# Patient Record
Sex: FEMALE | Race: WHITE | NOT HISPANIC OR LATINO | Employment: OTHER | URBAN - METROPOLITAN AREA
[De-identification: names, ages, dates, MRNs, and addresses within clinical notes are randomized per-mention and may not be internally consistent; named-entity substitution may affect disease eponyms.]

---

## 2020-06-11 ENCOUNTER — TELEPHONE (OUTPATIENT)
Dept: HEMATOLOGY/ONCOLOGY | Facility: CLINIC | Age: 50
End: 2020-06-11

## 2020-06-11 NOTE — TELEPHONE ENCOUNTER
----- Message from Sangita Laguna sent at 6/11/2020  1:32 PM CDT -----  Contact: [phone]/self  Patient is requesting a call back regarding finding a doctor to treat her. No one can find her veins unless they use an US and they have to do a central line. She has tumors in her neck and has scans/contrast ordered but they can't find her veins to do them. She is seen by Lidia Brizuela. There are no records of doctors nor treatments in the system. Please call her to discuss. Thanks

## 2020-06-11 NOTE — TELEPHONE ENCOUNTER
Spoke with patient who states she has a problem with her veins. She says that she has had a CT ordered and they can't get contrast in her because her veins keep blowing. States she has tumors in her neck and is being seen by an MD in [facility]. When asked if her tumors are benign or cancerous she states they are benign.  She states she really wants to know what is wrong with her veins at this point.  I spoke with our Nurse Navigator Rosa, who states that she will call the patient and speak with her.

## 2020-06-11 NOTE — TELEPHONE ENCOUNTER
LM for pt to return my call regarding message below.       ----- Message from Pat Torres LPN sent at 6/11/2020  2:01 PM CDT -----  Contact: [phone]/self      ----- Message -----  From: Sangita Laguna  Sent: 6/11/2020   1:32 PM CDT  To: [facility] Hematology Oncology Clinical Support    Patient is requesting a call back regarding finding a doctor to treat her. No one can find her veins unless they use an US and they have to do a central line. She has tumors in her neck and has scans/contrast ordered but they can't find her veins to do them. She is seen by Lidia Brizuela. There are no records of doctors nor treatments in the system. Please call her to discuss. Thanks

## 2020-06-11 NOTE — TELEPHONE ENCOUNTER
----- Message from Pat Torres LPN sent at 6/11/2020  1:45 PM CDT -----  Contact: [phone]/self      ----- Message -----  From: Sangita Laguna  Sent: 6/11/2020   1:32 PM CDT  To: [facility] Hematology Oncology Clinical Support    Patient is requesting a call back regarding finding a doctor to treat her. No one can find her veins unless they use an US and they have to do a central line. She has tumors in her neck and has scans/contrast ordered but they can't find her veins to do them. She is seen by Lidia Brizuela. There are no records of doctors nor treatments in the system. Please call her to discuss. Thanks

## 2020-10-21 PROBLEM — F43.10 PTSD (POST-TRAUMATIC STRESS DISORDER): Status: ACTIVE | Noted: 2018-03-28

## 2020-10-21 PROBLEM — E78.5 HYPERLIPIDEMIA: Status: ACTIVE | Noted: 2018-03-28

## 2020-10-21 PROBLEM — F99 INSOMNIA DUE TO OTHER MENTAL DISORDER: Status: ACTIVE | Noted: 2018-03-28

## 2020-10-21 PROBLEM — E53.8 B12 DEFICIENCY: Status: ACTIVE | Noted: 2019-03-14

## 2020-10-21 PROBLEM — R00.1 BRADYCARDIA: Status: ACTIVE | Noted: 2019-09-01

## 2020-10-21 PROBLEM — Z98.84 HISTORY OF ROUX-EN-Y GASTRIC BYPASS: Status: ACTIVE | Noted: 2019-09-01

## 2020-10-21 PROBLEM — F32.A DEPRESSION: Status: ACTIVE | Noted: 2020-10-21

## 2020-10-21 PROBLEM — F41.9 ANXIETY: Status: ACTIVE | Noted: 2020-10-21

## 2020-10-21 PROBLEM — J45.20 MILD INTERMITTENT ASTHMA WITHOUT COMPLICATION: Status: ACTIVE | Noted: 2018-10-31

## 2020-10-21 PROBLEM — R11.0 CHRONIC NAUSEA: Status: ACTIVE | Noted: 2018-03-28

## 2020-10-21 PROBLEM — F51.05 INSOMNIA DUE TO OTHER MENTAL DISORDER: Status: ACTIVE | Noted: 2018-03-28

## 2020-10-21 PROBLEM — Z86.718 HISTORY OF DVT (DEEP VEIN THROMBOSIS): Status: ACTIVE | Noted: 2020-10-21

## 2020-10-22 ENCOUNTER — TELEPHONE (OUTPATIENT)
Dept: OPHTHALMOLOGY | Facility: CLINIC | Age: 50
End: 2020-10-22

## 2020-11-03 ENCOUNTER — TELEPHONE (OUTPATIENT)
Dept: OPHTHALMOLOGY | Facility: CLINIC | Age: 50
End: 2020-11-03

## 2022-11-29 ENCOUNTER — TELEPHONE (OUTPATIENT)
Dept: PSYCHIATRY | Facility: CLINIC | Age: 52
End: 2022-11-29
Payer: MEDICARE

## 2022-11-29 NOTE — TELEPHONE ENCOUNTER
----- Message from Latonya Brizuela sent at 11/29/2022  1:47 PM CST -----  Regarding: Voicemail  Called for np appointment

## 2022-11-30 ENCOUNTER — LAB VISIT (OUTPATIENT)
Dept: LAB | Facility: HOSPITAL | Age: 52
End: 2022-11-30
Attending: FAMILY MEDICINE
Payer: MEDICARE

## 2022-11-30 ENCOUNTER — OFFICE VISIT (OUTPATIENT)
Dept: PRIMARY CARE CLINIC | Facility: CLINIC | Age: 52
End: 2022-11-30
Payer: MEDICARE

## 2022-11-30 VITALS
HEIGHT: 63 IN | TEMPERATURE: 98 F | DIASTOLIC BLOOD PRESSURE: 62 MMHG | HEART RATE: 71 BPM | WEIGHT: 160.94 LBS | BODY MASS INDEX: 28.52 KG/M2 | RESPIRATION RATE: 16 BRPM | SYSTOLIC BLOOD PRESSURE: 110 MMHG | OXYGEN SATURATION: 99 %

## 2022-11-30 DIAGNOSIS — E53.8 B12 DEFICIENCY: ICD-10-CM

## 2022-11-30 DIAGNOSIS — F43.10 PTSD (POST-TRAUMATIC STRESS DISORDER): ICD-10-CM

## 2022-11-30 DIAGNOSIS — K90.9 INTESTINAL MALABSORPTION, UNSPECIFIED TYPE: ICD-10-CM

## 2022-11-30 DIAGNOSIS — Z79.899 LONG-TERM USE OF HIGH-RISK MEDICATION: ICD-10-CM

## 2022-11-30 DIAGNOSIS — Z98.84 HISTORY OF ROUX-EN-Y GASTRIC BYPASS: ICD-10-CM

## 2022-11-30 DIAGNOSIS — E78.5 HYPERLIPIDEMIA, UNSPECIFIED HYPERLIPIDEMIA TYPE: ICD-10-CM

## 2022-11-30 DIAGNOSIS — F32.A ANXIETY AND DEPRESSION: ICD-10-CM

## 2022-11-30 DIAGNOSIS — F41.9 ANXIETY AND DEPRESSION: ICD-10-CM

## 2022-11-30 DIAGNOSIS — Z76.89 ENCOUNTER TO ESTABLISH CARE: Primary | ICD-10-CM

## 2022-11-30 DIAGNOSIS — E55.9 VITAMIN D DEFICIENCY: ICD-10-CM

## 2022-11-30 DIAGNOSIS — R00.1 BRADYCARDIA: ICD-10-CM

## 2022-11-30 DIAGNOSIS — Z11.4 ENCOUNTER FOR SCREENING FOR HIV: ICD-10-CM

## 2022-11-30 DIAGNOSIS — R55 SYNCOPE, UNSPECIFIED SYNCOPE TYPE: ICD-10-CM

## 2022-11-30 DIAGNOSIS — J45.30 MILD PERSISTENT ASTHMA WITHOUT COMPLICATION: ICD-10-CM

## 2022-11-30 DIAGNOSIS — Z76.89 ENCOUNTER TO ESTABLISH CARE: ICD-10-CM

## 2022-11-30 DIAGNOSIS — Z12.31 BREAST CANCER SCREENING BY MAMMOGRAM: ICD-10-CM

## 2022-11-30 DIAGNOSIS — J39.2 CYST OF PHARYNX: ICD-10-CM

## 2022-11-30 LAB
ALBUMIN SERPL BCP-MCNC: 4.4 G/DL (ref 3.5–5.2)
ALP SERPL-CCNC: 117 U/L (ref 55–135)
ALT SERPL W/O P-5'-P-CCNC: 27 U/L (ref 10–44)
ANION GAP SERPL CALC-SCNC: 13 MMOL/L (ref 8–16)
AST SERPL-CCNC: 18 U/L (ref 10–40)
BASOPHILS # BLD AUTO: 0.04 K/UL (ref 0–0.2)
BASOPHILS NFR BLD: 0.8 % (ref 0–1.9)
BILIRUB SERPL-MCNC: 1.1 MG/DL (ref 0.1–1)
BUN SERPL-MCNC: 16 MG/DL (ref 6–20)
CALCIUM SERPL-MCNC: 9.9 MG/DL (ref 8.7–10.5)
CHLORIDE SERPL-SCNC: 104 MMOL/L (ref 95–110)
CHOLEST SERPL-MCNC: 215 MG/DL (ref 120–199)
CHOLEST/HDLC SERPL: 3 {RATIO} (ref 2–5)
CO2 SERPL-SCNC: 26 MMOL/L (ref 23–29)
CREAT SERPL-MCNC: 0.8 MG/DL (ref 0.5–1.4)
DIFFERENTIAL METHOD: ABNORMAL
EOSINOPHIL # BLD AUTO: 0.1 K/UL (ref 0–0.5)
EOSINOPHIL NFR BLD: 2.1 % (ref 0–8)
ERYTHROCYTE [DISTWIDTH] IN BLOOD BY AUTOMATED COUNT: 14.3 % (ref 11.5–14.5)
EST. GFR  (NO RACE VARIABLE): >60 ML/MIN/1.73 M^2
ESTIMATED AVG GLUCOSE: 97 MG/DL (ref 68–131)
GLUCOSE SERPL-MCNC: 94 MG/DL (ref 70–110)
HBA1C MFR BLD: 5 % (ref 4–5.6)
HCT VFR BLD AUTO: 42.3 % (ref 37–48.5)
HDLC SERPL-MCNC: 72 MG/DL (ref 40–75)
HDLC SERPL: 33.5 % (ref 20–50)
HGB BLD-MCNC: 13.5 G/DL (ref 12–16)
IMM GRANULOCYTES # BLD AUTO: 0.01 K/UL (ref 0–0.04)
IMM GRANULOCYTES NFR BLD AUTO: 0.2 % (ref 0–0.5)
LDLC SERPL CALC-MCNC: 129.2 MG/DL (ref 63–159)
LYMPHOCYTES # BLD AUTO: 1 K/UL (ref 1–4.8)
LYMPHOCYTES NFR BLD: 18.9 % (ref 18–48)
MCH RBC QN AUTO: 30 PG (ref 27–31)
MCHC RBC AUTO-ENTMCNC: 31.9 G/DL (ref 32–36)
MCV RBC AUTO: 94 FL (ref 82–98)
MONOCYTES # BLD AUTO: 0.3 K/UL (ref 0.3–1)
MONOCYTES NFR BLD: 5.5 % (ref 4–15)
NEUTROPHILS # BLD AUTO: 3.7 K/UL (ref 1.8–7.7)
NEUTROPHILS NFR BLD: 72.5 % (ref 38–73)
NONHDLC SERPL-MCNC: 143 MG/DL
NRBC BLD-RTO: 0 /100 WBC
PLATELET # BLD AUTO: 269 K/UL (ref 150–450)
PMV BLD AUTO: 12.1 FL (ref 9.2–12.9)
POTASSIUM SERPL-SCNC: 4.3 MMOL/L (ref 3.5–5.1)
PROT SERPL-MCNC: 7.7 G/DL (ref 6–8.4)
RBC # BLD AUTO: 4.5 M/UL (ref 4–5.4)
SODIUM SERPL-SCNC: 143 MMOL/L (ref 136–145)
TRIGL SERPL-MCNC: 69 MG/DL (ref 30–150)
TSH SERPL DL<=0.005 MIU/L-ACNC: 2.25 UIU/ML (ref 0.4–4)
WBC # BLD AUTO: 5.12 K/UL (ref 3.9–12.7)

## 2022-11-30 PROCEDURE — 3008F PR BODY MASS INDEX (BMI) DOCUMENTED: ICD-10-PCS | Mod: CPTII,S$GLB,, | Performed by: FAMILY MEDICINE

## 2022-11-30 PROCEDURE — 82306 VITAMIN D 25 HYDROXY: CPT | Performed by: FAMILY MEDICINE

## 2022-11-30 PROCEDURE — 3008F BODY MASS INDEX DOCD: CPT | Mod: CPTII,S$GLB,, | Performed by: FAMILY MEDICINE

## 2022-11-30 PROCEDURE — 99999 PR PBB SHADOW E&M-EST. PATIENT-LVL V: ICD-10-PCS | Mod: PBBFAC,,, | Performed by: FAMILY MEDICINE

## 2022-11-30 PROCEDURE — 3074F PR MOST RECENT SYSTOLIC BLOOD PRESSURE < 130 MM HG: ICD-10-PCS | Mod: CPTII,S$GLB,, | Performed by: FAMILY MEDICINE

## 2022-11-30 PROCEDURE — 36415 COLL VENOUS BLD VENIPUNCTURE: CPT | Mod: PN | Performed by: FAMILY MEDICINE

## 2022-11-30 PROCEDURE — 80053 COMPREHEN METABOLIC PANEL: CPT | Performed by: FAMILY MEDICINE

## 2022-11-30 PROCEDURE — 84443 ASSAY THYROID STIM HORMONE: CPT | Performed by: FAMILY MEDICINE

## 2022-11-30 PROCEDURE — 99999 PR PBB SHADOW E&M-EST. PATIENT-LVL V: CPT | Mod: PBBFAC,,, | Performed by: FAMILY MEDICINE

## 2022-11-30 PROCEDURE — 99204 PR OFFICE/OUTPT VISIT, NEW, LEVL IV, 45-59 MIN: ICD-10-PCS | Mod: S$GLB,,, | Performed by: FAMILY MEDICINE

## 2022-11-30 PROCEDURE — 1160F PR REVIEW ALL MEDS BY PRESCRIBER/CLIN PHARMACIST DOCUMENTED: ICD-10-PCS | Mod: CPTII,S$GLB,, | Performed by: FAMILY MEDICINE

## 2022-11-30 PROCEDURE — 3078F PR MOST RECENT DIASTOLIC BLOOD PRESSURE < 80 MM HG: ICD-10-PCS | Mod: CPTII,S$GLB,, | Performed by: FAMILY MEDICINE

## 2022-11-30 PROCEDURE — 3078F DIAST BP <80 MM HG: CPT | Mod: CPTII,S$GLB,, | Performed by: FAMILY MEDICINE

## 2022-11-30 PROCEDURE — 1159F PR MEDICATION LIST DOCUMENTED IN MEDICAL RECORD: ICD-10-PCS | Mod: CPTII,S$GLB,, | Performed by: FAMILY MEDICINE

## 2022-11-30 PROCEDURE — 1160F RVW MEDS BY RX/DR IN RCRD: CPT | Mod: CPTII,S$GLB,, | Performed by: FAMILY MEDICINE

## 2022-11-30 PROCEDURE — 83921 ORGANIC ACID SINGLE QUANT: CPT | Performed by: FAMILY MEDICINE

## 2022-11-30 PROCEDURE — 87389 HIV-1 AG W/HIV-1&-2 AB AG IA: CPT | Performed by: FAMILY MEDICINE

## 2022-11-30 PROCEDURE — 80061 LIPID PANEL: CPT | Performed by: FAMILY MEDICINE

## 2022-11-30 PROCEDURE — 3074F SYST BP LT 130 MM HG: CPT | Mod: CPTII,S$GLB,, | Performed by: FAMILY MEDICINE

## 2022-11-30 PROCEDURE — 83036 HEMOGLOBIN GLYCOSYLATED A1C: CPT | Performed by: FAMILY MEDICINE

## 2022-11-30 PROCEDURE — 1159F MED LIST DOCD IN RCRD: CPT | Mod: CPTII,S$GLB,, | Performed by: FAMILY MEDICINE

## 2022-11-30 PROCEDURE — 85025 COMPLETE CBC W/AUTO DIFF WBC: CPT | Performed by: FAMILY MEDICINE

## 2022-11-30 PROCEDURE — 82607 VITAMIN B-12: CPT | Performed by: FAMILY MEDICINE

## 2022-11-30 PROCEDURE — 99204 OFFICE O/P NEW MOD 45 MIN: CPT | Mod: S$GLB,,, | Performed by: FAMILY MEDICINE

## 2022-11-30 RX ORDER — ALBUTEROL SULFATE 90 UG/1
2 AEROSOL, METERED RESPIRATORY (INHALATION) EVERY 6 HOURS PRN
COMMUNITY
Start: 2022-08-08 | End: 2022-11-30 | Stop reason: SDUPTHER

## 2022-11-30 RX ORDER — ONDANSETRON 4 MG/1
4 TABLET, FILM COATED ORAL DAILY PRN
Qty: 90 TABLET | Refills: 3 | Status: SHIPPED | OUTPATIENT
Start: 2022-11-30

## 2022-11-30 RX ORDER — PRAZOSIN HYDROCHLORIDE 2 MG/1
2 CAPSULE ORAL NIGHTLY
COMMUNITY
Start: 2022-08-04 | End: 2022-11-30 | Stop reason: SDUPTHER

## 2022-11-30 RX ORDER — FLUOROMETHOLONE 1 MG/ML
SUSPENSION/ DROPS OPHTHALMIC
COMMUNITY
Start: 2022-06-28

## 2022-11-30 RX ORDER — ONDANSETRON 4 MG/1
4 TABLET, FILM COATED ORAL DAILY PRN
COMMUNITY
Start: 2022-08-08 | End: 2022-11-30 | Stop reason: SDUPTHER

## 2022-11-30 RX ORDER — ALPRAZOLAM 1 MG/1
1 TABLET ORAL
COMMUNITY
Start: 2022-11-05

## 2022-11-30 RX ORDER — ZOLPIDEM TARTRATE 10 MG/1
1 TABLET ORAL NIGHTLY PRN
COMMUNITY
Start: 2022-11-29

## 2022-11-30 RX ORDER — FLUTICASONE PROPIONATE AND SALMETEROL 250; 50 UG/1; UG/1
1 POWDER RESPIRATORY (INHALATION) 2 TIMES DAILY
Qty: 180 EACH | Refills: 3 | Status: SHIPPED | OUTPATIENT
Start: 2022-11-30 | End: 2024-03-04 | Stop reason: SDUPTHER

## 2022-11-30 RX ORDER — CYANOCOBALAMIN 1000 UG/ML
1000 INJECTION, SOLUTION INTRAMUSCULAR; SUBCUTANEOUS
COMMUNITY
Start: 2022-08-08 | End: 2022-11-30 | Stop reason: SDUPTHER

## 2022-11-30 RX ORDER — SERTRALINE HYDROCHLORIDE 100 MG/1
1 TABLET, FILM COATED ORAL NIGHTLY PRN
COMMUNITY
Start: 2022-11-29

## 2022-11-30 RX ORDER — ALBUTEROL SULFATE 0.83 MG/ML
SOLUTION RESPIRATORY (INHALATION)
Qty: 75 ML | Refills: 3 | Status: SHIPPED | OUTPATIENT
Start: 2022-11-30

## 2022-11-30 RX ORDER — FLUTICASONE PROPIONATE AND SALMETEROL 250; 50 UG/1; UG/1
POWDER RESPIRATORY (INHALATION)
COMMUNITY
Start: 2022-09-07 | End: 2022-11-30 | Stop reason: SDUPTHER

## 2022-11-30 RX ORDER — TRIAMCINOLONE ACETONIDE 1 MG/G
1 CREAM TOPICAL 2 TIMES DAILY
COMMUNITY
Start: 2022-01-11 | End: 2023-03-15

## 2022-11-30 RX ORDER — PRAZOSIN HYDROCHLORIDE 2 MG/1
2 CAPSULE ORAL NIGHTLY
Qty: 90 CAPSULE | Refills: 3 | Status: SHIPPED | OUTPATIENT
Start: 2022-11-30

## 2022-11-30 RX ORDER — NALOXONE HYDROCHLORIDE 4 MG/.1ML
SPRAY NASAL
COMMUNITY
Start: 2022-09-07

## 2022-11-30 RX ORDER — CYCLOSPORINE 0.5 MG/ML
1 EMULSION OPHTHALMIC 2 TIMES DAILY
COMMUNITY
Start: 2022-07-16

## 2022-11-30 RX ORDER — ALBUTEROL SULFATE 0.83 MG/ML
SOLUTION RESPIRATORY (INHALATION)
COMMUNITY
Start: 2022-09-07 | End: 2022-11-30 | Stop reason: SDUPTHER

## 2022-11-30 RX ORDER — ALBUTEROL SULFATE 90 UG/1
2 AEROSOL, METERED RESPIRATORY (INHALATION) EVERY 6 HOURS PRN
Qty: 18 G | Refills: 3 | Status: SHIPPED | OUTPATIENT
Start: 2022-11-30 | End: 2024-03-04 | Stop reason: SDUPTHER

## 2022-11-30 RX ORDER — HYDROCODONE BITARTRATE AND ACETAMINOPHEN 7.5; 325 MG/1; MG/1
TABLET ORAL
COMMUNITY
Start: 2022-08-08

## 2022-11-30 RX ORDER — VENLAFAXINE HYDROCHLORIDE 37.5 MG/1
37.5 CAPSULE, EXTENDED RELEASE ORAL
COMMUNITY
Start: 2022-07-25

## 2022-11-30 RX ORDER — CYANOCOBALAMIN 1000 UG/ML
1000 INJECTION, SOLUTION INTRAMUSCULAR; SUBCUTANEOUS
Qty: 3 ML | Refills: 3 | Status: SHIPPED | OUTPATIENT
Start: 2022-11-30 | End: 2024-03-04 | Stop reason: SDUPTHER
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
## 2022-11-30 NOTE — PATIENT INSTRUCTIONS
Physically, everything looks pretty good today.      Screening blood work done today.  Results will be posted to [facility] uses they are available.      Medication refills sent to pharmacy today.  Please continue taking them, as directed.      Where overdue for screening mammogram.  Order placed today.  Feel free to schedule this at your convenience.      Referral to cardiology-electrophysiology placed today.  Someone from that department should contact you to schedule an appointment.      For now, continue your established relationship with Dr. Hall, psychiatry.    For the throat, try using saltwater gargles two or 3 times per day, 15 seconds at a time.  This can help soothe the throat.  I also like OTC zinc lozenges.  If the throat continues to bother you, or if it starts to worsen, please let me know asap.  We can get you in with our ENT to take another look.      Continue to eat a healthy diet.  Be careful with portion sizes.  Includes lots of fresh fruits, vegetables, whole grains, lean proteins.  See info below.    Keep hydrated.  Be sure to drink at least 8-10, 8 oz, glasses of water every day.    Stay active.  Try to do some sort of physical activity every day.  Nothing outrageous, just try walking for 10-15 minutes each day.

## 2022-12-01 LAB
25(OH)D3+25(OH)D2 SERPL-MCNC: 43 NG/ML (ref 30–96)
HIV 1+2 AB+HIV1 P24 AG SERPL QL IA: NORMAL

## 2022-12-02 LAB — VIT B12 SERPL-MCNC: 335 NG/L (ref 180–914)

## 2022-12-05 ENCOUNTER — PATIENT MESSAGE (OUTPATIENT)
Dept: ADMINISTRATIVE | Facility: HOSPITAL | Age: 52
End: 2022-12-05
Payer: MEDICARE

## 2022-12-06 LAB — METHYLMALONATE SERPL-SCNC: 0.15 NMOL/ML

## 2022-12-09 NOTE — PROGRESS NOTES
Ms. Ela,    You should be able to see the results of your recent blood work in [facility].  Overall, most things look good.      Blood counts all look fine.  Electrolytes, kidney function, liver function, and thyroid function also look good.  Vitamin-D and B12 levels look okay.  You are negative for HIV.    Cholesterol levels look okay.  Total cholesterol was 215.  Ideally we would like this under 200, but you are so close that I am not going to fuss.  Triglycerides look great at 69.  Anything under 150 is normal.  HDL, or good cholesterol, is perfect at 72.  Anything over 40 is good, over 50s even better.  LDL, or bad cholesterol, is right at 129.  We would prefer it to be closer to 100, but under 130 is acceptable.    Please let us know if you have any questions!

## 2022-12-28 DIAGNOSIS — Z76.89 ENCOUNTER TO ESTABLISH CARE WITH NEW DOCTOR: Primary | ICD-10-CM

## 2023-01-25 ENCOUNTER — PATIENT MESSAGE (OUTPATIENT)
Dept: ADMINISTRATIVE | Facility: HOSPITAL | Age: 53
End: 2023-01-25
Payer: MEDICARE

## 2023-03-10 ENCOUNTER — TELEPHONE (OUTPATIENT)
Dept: PRIMARY CARE CLINIC | Facility: CLINIC | Age: 53
End: 2023-03-10
Payer: MEDICARE

## 2023-03-10 NOTE — TELEPHONE ENCOUNTER
----- Message from Kathy Mojica sent at 3/10/2023 12:59 PM CST -----  Contact: Ela  Ela would like a call back at [phone] in regards to seeing an urinalysis order can be put in for her  if possible due to having a possible UTI or kidney issue.  Thanks   Am

## 2023-03-15 ENCOUNTER — LAB VISIT (OUTPATIENT)
Dept: LAB | Facility: HOSPITAL | Age: 53
End: 2023-03-15
Attending: NURSE PRACTITIONER
Payer: MEDICARE

## 2023-03-15 ENCOUNTER — OFFICE VISIT (OUTPATIENT)
Dept: OBSTETRICS AND GYNECOLOGY | Facility: CLINIC | Age: 53
End: 2023-03-15
Payer: MEDICARE

## 2023-03-15 VITALS
SYSTOLIC BLOOD PRESSURE: 118 MMHG | BODY MASS INDEX: 30.16 KG/M2 | HEIGHT: 63 IN | DIASTOLIC BLOOD PRESSURE: 72 MMHG | WEIGHT: 170.19 LBS

## 2023-03-15 DIAGNOSIS — S31.41XA VAGINAL LACERATION, INITIAL ENCOUNTER: ICD-10-CM

## 2023-03-15 DIAGNOSIS — R39.89 ABNORMAL URINE COLOR: ICD-10-CM

## 2023-03-15 DIAGNOSIS — Z01.419 ENCOUNTER FOR GYNECOLOGICAL EXAMINATION WITHOUT ABNORMAL FINDING: Primary | ICD-10-CM

## 2023-03-15 DIAGNOSIS — Z12.31 ENCOUNTER FOR SCREENING MAMMOGRAM FOR BREAST CANCER: ICD-10-CM

## 2023-03-15 DIAGNOSIS — N93.0 POSTCOITAL BLEEDING: ICD-10-CM

## 2023-03-15 DIAGNOSIS — Z11.3 SCREENING FOR STD (SEXUALLY TRANSMITTED DISEASE): ICD-10-CM

## 2023-03-15 LAB
BILIRUB UR QL STRIP: NEGATIVE
CLARITY UR REFRACT.AUTO: CLEAR
COLOR UR AUTO: YELLOW
GLUCOSE UR QL STRIP: NEGATIVE
HAV IGM SERPL QL IA: NORMAL
HBV CORE IGM SERPL QL IA: NORMAL
HBV SURFACE AG SERPL QL IA: NORMAL
HCV AB SERPL QL IA: NORMAL
HGB UR QL STRIP: ABNORMAL
HIV 1+2 AB+HIV1 P24 AG SERPL QL IA: NORMAL
KETONES UR QL STRIP: NEGATIVE
LEUKOCYTE ESTERASE UR QL STRIP: NEGATIVE
NITRITE UR QL STRIP: NEGATIVE
PH UR STRIP: 5 [PH] (ref 5–8)
PROT UR QL STRIP: NEGATIVE
SP GR UR STRIP: 1.02 (ref 1–1.03)
URN SPEC COLLECT METH UR: ABNORMAL

## 2023-03-15 PROCEDURE — 81514 NFCT DS BV&VAGINITIS DNA ALG: CPT | Performed by: NURSE PRACTITIONER

## 2023-03-15 PROCEDURE — 3008F BODY MASS INDEX DOCD: CPT | Mod: CPTII,S$GLB,, | Performed by: NURSE PRACTITIONER

## 2023-03-15 PROCEDURE — 17250 CHEM CAUT OF GRANLTJ TISSUE: CPT | Mod: S$GLB,,, | Performed by: NURSE PRACTITIONER

## 2023-03-15 PROCEDURE — 1159F MED LIST DOCD IN RCRD: CPT | Mod: CPTII,S$GLB,, | Performed by: NURSE PRACTITIONER

## 2023-03-15 PROCEDURE — 3008F PR BODY MASS INDEX (BMI) DOCUMENTED: ICD-10-PCS | Mod: CPTII,S$GLB,, | Performed by: NURSE PRACTITIONER

## 2023-03-15 PROCEDURE — 87591 N.GONORRHOEAE DNA AMP PROB: CPT | Performed by: NURSE PRACTITIONER

## 2023-03-15 PROCEDURE — 87086 URINE CULTURE/COLONY COUNT: CPT | Performed by: NURSE PRACTITIONER

## 2023-03-15 PROCEDURE — 81003 URINALYSIS AUTO W/O SCOPE: CPT | Performed by: NURSE PRACTITIONER

## 2023-03-15 PROCEDURE — 17250 PR CHEM CAUTERY GRANULATN TISSUE: ICD-10-PCS | Mod: S$GLB,,, | Performed by: NURSE PRACTITIONER

## 2023-03-15 PROCEDURE — 99203 PR OFFICE/OUTPT VISIT, NEW, LEVL III, 30-44 MIN: ICD-10-PCS | Mod: 25,S$GLB,, | Performed by: NURSE PRACTITIONER

## 2023-03-15 PROCEDURE — 99999 PR PBB SHADOW E&M-EST. PATIENT-LVL IV: CPT | Mod: PBBFAC,,, | Performed by: NURSE PRACTITIONER

## 2023-03-15 PROCEDURE — 87389 HIV-1 AG W/HIV-1&-2 AB AG IA: CPT | Performed by: NURSE PRACTITIONER

## 2023-03-15 PROCEDURE — 3078F PR MOST RECENT DIASTOLIC BLOOD PRESSURE < 80 MM HG: ICD-10-PCS | Mod: CPTII,S$GLB,, | Performed by: NURSE PRACTITIONER

## 2023-03-15 PROCEDURE — 1159F PR MEDICATION LIST DOCUMENTED IN MEDICAL RECORD: ICD-10-PCS | Mod: CPTII,S$GLB,, | Performed by: NURSE PRACTITIONER

## 2023-03-15 PROCEDURE — 3074F SYST BP LT 130 MM HG: CPT | Mod: CPTII,S$GLB,, | Performed by: NURSE PRACTITIONER

## 2023-03-15 PROCEDURE — 99203 OFFICE O/P NEW LOW 30 MIN: CPT | Mod: 25,S$GLB,, | Performed by: NURSE PRACTITIONER

## 2023-03-15 PROCEDURE — 99999 PR PBB SHADOW E&M-EST. PATIENT-LVL IV: ICD-10-PCS | Mod: PBBFAC,,, | Performed by: NURSE PRACTITIONER

## 2023-03-15 PROCEDURE — 86592 SYPHILIS TEST NON-TREP QUAL: CPT | Performed by: NURSE PRACTITIONER

## 2023-03-15 PROCEDURE — 1160F PR REVIEW ALL MEDS BY PRESCRIBER/CLIN PHARMACIST DOCUMENTED: ICD-10-PCS | Mod: CPTII,S$GLB,, | Performed by: NURSE PRACTITIONER

## 2023-03-15 PROCEDURE — 36415 COLL VENOUS BLD VENIPUNCTURE: CPT | Mod: PN | Performed by: NURSE PRACTITIONER

## 2023-03-15 PROCEDURE — 1160F RVW MEDS BY RX/DR IN RCRD: CPT | Mod: CPTII,S$GLB,, | Performed by: NURSE PRACTITIONER

## 2023-03-15 PROCEDURE — 3074F PR MOST RECENT SYSTOLIC BLOOD PRESSURE < 130 MM HG: ICD-10-PCS | Mod: CPTII,S$GLB,, | Performed by: NURSE PRACTITIONER

## 2023-03-15 PROCEDURE — 80074 ACUTE HEPATITIS PANEL: CPT | Performed by: NURSE PRACTITIONER

## 2023-03-15 PROCEDURE — 3078F DIAST BP <80 MM HG: CPT | Mod: CPTII,S$GLB,, | Performed by: NURSE PRACTITIONER
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
## 2023-03-16 LAB
BACTERIA UR CULT: NO GROWTH
C TRACH DNA SPEC QL NAA+PROBE: NOT DETECTED
N GONORRHOEA DNA SPEC QL NAA+PROBE: NOT DETECTED
RPR SER QL: NORMAL

## 2023-03-17 LAB
BACTERIAL VAGINOSIS DNA: NEGATIVE
CANDIDA GLABRATA DNA: NEGATIVE
CANDIDA KRUSEI DNA: NEGATIVE
CANDIDA RRNA VAG QL PROBE: NEGATIVE
T VAGINALIS RRNA GENITAL QL PROBE: NEGATIVE

## 2023-03-29 ENCOUNTER — HOSPITAL ENCOUNTER (OUTPATIENT)
Dept: RADIOLOGY | Facility: HOSPITAL | Age: 53
Discharge: HOME | End: 2023-03-29
Attending: NURSE PRACTITIONER
Payer: MEDICARE

## 2023-03-29 ENCOUNTER — OFFICE VISIT (OUTPATIENT)
Dept: OBSTETRICS AND GYNECOLOGY | Facility: CLINIC | Age: 53
End: 2023-03-29
Payer: MEDICARE

## 2023-03-29 VITALS
HEIGHT: 63 IN | DIASTOLIC BLOOD PRESSURE: 74 MMHG | SYSTOLIC BLOOD PRESSURE: 118 MMHG | BODY MASS INDEX: 30.82 KG/M2 | WEIGHT: 173.94 LBS

## 2023-03-29 DIAGNOSIS — Z12.31 ENCOUNTER FOR SCREENING MAMMOGRAM FOR BREAST CANCER: ICD-10-CM

## 2023-03-29 DIAGNOSIS — N95.2 VAGINAL ATROPHY: Primary | ICD-10-CM

## 2023-03-29 PROCEDURE — 3074F SYST BP LT 130 MM HG: CPT | Mod: CPTII,S$GLB,, | Performed by: NURSE PRACTITIONER

## 2023-03-29 PROCEDURE — 3074F PR MOST RECENT SYSTOLIC BLOOD PRESSURE < 130 MM HG: ICD-10-PCS | Mod: CPTII,S$GLB,, | Performed by: NURSE PRACTITIONER

## 2023-03-29 PROCEDURE — 3008F BODY MASS INDEX DOCD: CPT | Mod: CPTII,S$GLB,, | Performed by: NURSE PRACTITIONER

## 2023-03-29 PROCEDURE — 77067 MAMMO DIGITAL SCREENING BILAT WITH TOMO: ICD-10-PCS | Mod: 26,,, | Performed by: RADIOLOGY

## 2023-03-29 PROCEDURE — 3008F PR BODY MASS INDEX (BMI) DOCUMENTED: ICD-10-PCS | Mod: CPTII,S$GLB,, | Performed by: NURSE PRACTITIONER

## 2023-03-29 PROCEDURE — 99999 PR PBB SHADOW E&M-EST. PATIENT-LVL III: ICD-10-PCS | Mod: PBBFAC,,, | Performed by: NURSE PRACTITIONER

## 2023-03-29 PROCEDURE — 99213 PR OFFICE/OUTPT VISIT, EST, LEVL III, 20-29 MIN: ICD-10-PCS | Mod: S$GLB,,, | Performed by: NURSE PRACTITIONER

## 2023-03-29 PROCEDURE — 99213 OFFICE O/P EST LOW 20 MIN: CPT | Mod: S$GLB,,, | Performed by: NURSE PRACTITIONER

## 2023-03-29 PROCEDURE — 1160F RVW MEDS BY RX/DR IN RCRD: CPT | Mod: CPTII,S$GLB,, | Performed by: NURSE PRACTITIONER

## 2023-03-29 PROCEDURE — 3078F DIAST BP <80 MM HG: CPT | Mod: CPTII,S$GLB,, | Performed by: NURSE PRACTITIONER

## 2023-03-29 PROCEDURE — 1159F PR MEDICATION LIST DOCUMENTED IN MEDICAL RECORD: ICD-10-PCS | Mod: CPTII,S$GLB,, | Performed by: NURSE PRACTITIONER

## 2023-03-29 PROCEDURE — 3078F PR MOST RECENT DIASTOLIC BLOOD PRESSURE < 80 MM HG: ICD-10-PCS | Mod: CPTII,S$GLB,, | Performed by: NURSE PRACTITIONER

## 2023-03-29 PROCEDURE — 1159F MED LIST DOCD IN RCRD: CPT | Mod: CPTII,S$GLB,, | Performed by: NURSE PRACTITIONER

## 2023-03-29 PROCEDURE — 77063 MAMMO DIGITAL SCREENING BILAT WITH TOMO: ICD-10-PCS | Mod: 26,,, | Performed by: RADIOLOGY

## 2023-03-29 PROCEDURE — 99999 PR PBB SHADOW E&M-EST. PATIENT-LVL III: CPT | Mod: PBBFAC,,, | Performed by: NURSE PRACTITIONER

## 2023-03-29 PROCEDURE — 1160F PR REVIEW ALL MEDS BY PRESCRIBER/CLIN PHARMACIST DOCUMENTED: ICD-10-PCS | Mod: CPTII,S$GLB,, | Performed by: NURSE PRACTITIONER

## 2023-03-29 PROCEDURE — 77067 SCR MAMMO BI INCL CAD: CPT | Mod: 26,,, | Performed by: RADIOLOGY

## 2023-03-29 PROCEDURE — 77067 SCR MAMMO BI INCL CAD: CPT | Mod: TC,PN

## 2023-03-29 PROCEDURE — 77063 BREAST TOMOSYNTHESIS BI: CPT | Mod: 26,,, | Performed by: RADIOLOGY

## 2023-03-29 RX ORDER — ESTRADIOL 0.1 MG/G
1 CREAM VAGINAL DAILY
Qty: 42.5 G | Refills: 1 | Status: SHIPPED | OUTPATIENT
Start: 2023-03-29 | End: 2024-03-28

## 2023-03-29 NOTE — PROGRESS NOTES
"  Ela Lynn is a 53 y.o. female recheck from 2 weeks ago due to bleeding with intercourse  Cautery was done to an area that was showing some bleeding.   Has not had any bleeding since last visit.   All testing was negative for STD and vaginitis was negative for bacteria and yeast.     Past Medical History:   Diagnosis Date    Asthma     PTSD (post-traumatic stress disorder)      Past Surgical History:   Procedure Laterality Date    FOOT SURGERY Left 2018    plate in foot    TAYO-EN-Y PROCEDURE  2019    THROAT SURGERY  2020     Family History   Problem Relation Age of Onset    Diabetes Mother     Heart disease Father      Social History     Tobacco Use    Smoking status: Never     Passive exposure: Never    Smokeless tobacco: Never   Substance Use Topics    Alcohol use: Not Currently    Drug use: Never     OB History    No obstetric history on file.         /74 (BP Location: Left arm, Patient Position: Sitting, BP Method: Medium (Manual))   Ht 5' 3" (1.6 m)   Wt 78.9 kg (173 lb 15.1 oz)   LMP 11/30/2022   BMI 30.81 kg/m²     ROS:  Per hpi    PE:   APPEARANCE: Well nourished, well developed, in no acute distress.  AFFECT: WNL, alert and oriented x 3.  SKIN: No acne or hirsutism.  NECK: Neck symmetric without masses or thyromegaly.  PELVIC: Normal external female genitalia without lesions. Normal hair distribution. Adequate perineal body, normal urethral meatus. Vagina atrophic without lesions or discharge. No significant cystocele or rectocele. Cuff line shows no bleeding or granulation tissue  Swabbed vaginal cuff and even with light swabbing with dry swab skin is friable due to dryness and had some mild bleeding     Physical Exam     1. Vaginal atrophy  estradioL (ESTRACE) 0.01 % (0.1 mg/gram) vaginal cream       and PLAN:    Ela was seen today for follow-up.    Diagnoses and all orders for this visit:    Vaginal atrophy  -     estradioL (ESTRACE) 0.01 % (0.1 mg/gram) vaginal cream; Place 1 g " vaginally once daily.      Long discussion had on vaginal dryness   Will start estrogen cream - r/b of estrogen cream discussed with pt and very low risk with history of DVT which pt states was many years ago   She is to refrain from intercourse until seen back in about 12 weeks as any stimulation at this time will cause her to have bleeding - pt is agreeable to this plan

## 2023-03-29 NOTE — PROGRESS NOTES
MsProsper Ela,    Attached are the results of your recent mammogram.  Everything looks great! There are no signs of any suspicious masses, lesions, nor tumors.      We can plan to repeat this in one year.      Please let me know if you have any questions!

## 2023-04-05 DIAGNOSIS — Z76.89 ENCOUNTER TO ESTABLISH CARE WITH NEW DOCTOR: Primary | ICD-10-CM

## 2024-03-04 DIAGNOSIS — J45.30 MILD PERSISTENT ASTHMA WITHOUT COMPLICATION: ICD-10-CM

## 2024-03-04 DIAGNOSIS — E53.8 B12 DEFICIENCY: ICD-10-CM

## 2024-03-04 RX ORDER — FLUTICASONE PROPIONATE AND SALMETEROL 250; 50 UG/1; UG/1
1 POWDER RESPIRATORY (INHALATION) 2 TIMES DAILY
Qty: 180 EACH | Refills: 3 | Status: SHIPPED | OUTPATIENT
Start: 2024-03-04

## 2024-03-04 RX ORDER — ALBUTEROL SULFATE 90 UG/1
2 AEROSOL, METERED RESPIRATORY (INHALATION) EVERY 6 HOURS PRN
Qty: 18 G | Refills: 3 | Status: SHIPPED | OUTPATIENT
Start: 2024-03-04

## 2024-03-04 RX ORDER — CYANOCOBALAMIN 1000 UG/ML
1000 INJECTION, SOLUTION INTRAMUSCULAR; SUBCUTANEOUS
Qty: 3 ML | Refills: 3 | Status: SHIPPED | OUTPATIENT
Start: 2024-03-04

## 2024-03-04 NOTE — TELEPHONE ENCOUNTER
Care Due:                  Date            Visit Type   Department     Provider  --------------------------------------------------------------------------------                                NP -                              PRIMARY      [facility] PRIMARY  Last Visit: 11-      CARE ([facility])   MARIMAR Draper                              EP -                              PRIMARY      [facility] PRIMARY  Next Visit: 07-      CARE ([facility])   MARIMAR Draper                                                            Last  Test          Frequency    Reason                     Performed    Due Date  --------------------------------------------------------------------------------    Office Visit  15 months..  JERAMIE prazosin.........  11- 02-    [facility] Embedded Care Due Messages. Reference number: [id number].   3/04/2024 10:05:15 AM CST

## 2024-03-04 NOTE — TELEPHONE ENCOUNTER
Refill Routing Note   Medication(s) are not appropriate for processing by [facility] for the following reason(s):        Patient not seen by provider within 15 months    [facility] action(s):  Defer        Medication Therapy Plan: LOV 11/30/22; FOVS 7/1/24      Appointments  past 12m or future 3m with PCP    Date Provider   Last Visit   11/30/2022 Nino Draper MD   Next Visit   7/1/2024 Nino Draper MD   ED visits in past 90 days: 0        Note composed:4:34 PM 03/04/2024

## 2024-03-04 NOTE — TELEPHONE ENCOUNTER
----- Message from Latonia Figueroa sent at 3/4/2024  9:51 AM CST -----  Contact: Ela  Pt called to [facility] 3[facility]4 appt due to needing dental surgery/a lot of pain. R,s to 7/1, but the pt states she needs refills of her advair, inhaler and vitamin shot. Could the refills be sent to cover her until her new appt? Please call her back at [phone].    Thanks  TS

## 2024-04-08 ENCOUNTER — OFFICE VISIT (OUTPATIENT)
Dept: PRIMARY CARE CLINIC | Facility: CLINIC | Age: 54
End: 2024-04-08
Payer: MEDICARE

## 2024-04-08 VITALS
TEMPERATURE: 98 F | WEIGHT: 226.88 LBS | OXYGEN SATURATION: 99 % | SYSTOLIC BLOOD PRESSURE: 150 MMHG | DIASTOLIC BLOOD PRESSURE: 92 MMHG | HEART RATE: 75 BPM | HEIGHT: 63 IN | BODY MASS INDEX: 40.2 KG/M2

## 2024-04-08 DIAGNOSIS — S82.892D CLOSED FRACTURE OF LEFT ANKLE WITH ROUTINE HEALING, SUBSEQUENT ENCOUNTER: ICD-10-CM

## 2024-04-08 DIAGNOSIS — R60.0 FLUID RETENTION IN LEGS: ICD-10-CM

## 2024-04-08 DIAGNOSIS — R33.9 URINARY RETENTION: ICD-10-CM

## 2024-04-08 DIAGNOSIS — Z09 HOSPITAL DISCHARGE FOLLOW-UP: Primary | ICD-10-CM

## 2024-04-08 PROCEDURE — 99215 OFFICE O/P EST HI 40 MIN: CPT | Mod: S$GLB,,, | Performed by: FAMILY MEDICINE

## 2024-04-08 PROCEDURE — 1159F MED LIST DOCD IN RCRD: CPT | Mod: CPTII,S$GLB,, | Performed by: FAMILY MEDICINE

## 2024-04-08 PROCEDURE — 99999 PR PBB SHADOW E&M-EST. PATIENT-LVL IV: CPT | Mod: PBBFAC,,, | Performed by: FAMILY MEDICINE

## 2024-04-08 PROCEDURE — G2211 COMPLEX E/M VISIT ADD ON: HCPCS | Mod: S$GLB,,, | Performed by: FAMILY MEDICINE

## 2024-04-08 PROCEDURE — 3077F SYST BP >= 140 MM HG: CPT | Mod: CPTII,S$GLB,, | Performed by: FAMILY MEDICINE

## 2024-04-08 PROCEDURE — 3080F DIAST BP >= 90 MM HG: CPT | Mod: CPTII,S$GLB,, | Performed by: FAMILY MEDICINE

## 2024-04-08 PROCEDURE — 3008F BODY MASS INDEX DOCD: CPT | Mod: CPTII,S$GLB,, | Performed by: FAMILY MEDICINE

## 2024-04-08 RX ORDER — HYDROCODONE BITARTRATE AND ACETAMINOPHEN 10; 325 MG/1; MG/1
1 TABLET ORAL EVERY 8 HOURS PRN
Qty: 21 TABLET | Refills: 0 | Status: SHIPPED | OUTPATIENT
Start: 2024-04-08 | End: 2024-04-15

## 2024-04-08 RX ORDER — BUMETANIDE 2 MG/1
2 TABLET ORAL DAILY
Qty: 5 TABLET | Refills: 0 | Status: SHIPPED | OUTPATIENT
Start: 2024-04-08 | End: 2024-04-13

## 2024-04-08 RX ORDER — PRAZOSIN HYDROCHLORIDE 5 MG/1
5 CAPSULE ORAL
COMMUNITY
Start: 2023-11-30 | End: 2024-05-28

## 2024-04-08 RX ORDER — DIAZEPAM 10 MG/1
TABLET ORAL
COMMUNITY

## 2024-04-08 RX ORDER — TAMSULOSIN HYDROCHLORIDE 0.4 MG/1
0.4 CAPSULE ORAL DAILY
Qty: 5 CAPSULE | Refills: 0 | Status: SHIPPED | OUTPATIENT
Start: 2024-04-08 | End: 2024-04-13

## 2024-04-08 NOTE — PROGRESS NOTES
"    [facility]       [address] [place] [address]      Phone: [phone]      Fax: [phone]    Nino Draper MD                Office Visit  04/08/2024        Subjective      HPI:  Ela Lynn is a 54 y.o. female presents today in clinic for "Hospital Follow Up, Ankle Injury, Back Pain, Urinary Retention, and Edema  ."     54-year-old female presents today for hospital follow-up.      Patient states that since last visit she has been in [place] helping take care of her son.  While she was up there, she has been doing lots of physical labor.  Thinks she may have hurt her back while she was up there.      Last week, she was at home, in [place].  For the last eight months, or so, she is felt some numbness, tingling in her left leg.  Sometimes her foot feels like it falls asleep.  Last week, she has been sitting down for an extended period.  When she tried to get up, her foot was tingling.  Felt paralyzed.  As a result, she tripped, fell.  Had to crawl to her scooter to get up.  Fairly quickly, the foot swelled up, felt swollen.  She went to the ER to get checked out.  When they viewed the x-rays in the ER, they did not see a sign of fracture.  Eventually, the radiologist report came back that showed a small, avulsion fracture in the lateral malleolus.    Patient states that foot seems to be even more swollen now.  She has an extensive history of incidence with that foot.  Several years ago, she broke a toe.  Initially had a screw placed.  Eventually, the screw was working its way out, so the orthopedist removed it and put a plate in place instead.  She states that it feels like the fluid is underneath the plate.  States that her little toe started to turn black.    Also feels like she is having fluid in her chest.  Woke up last night, could not breathe.  Tried taking some Lasix, but it did not help.  Was not able to urinate.  Took a neither " dose, twice as much, and still have not urinated.  States she has not urinated in over two days.  Now also feeling short of breath.    When she was in the ER, in addition to pain medicine they gave her ice, put her in a boot.  States that the boot is extremely uncomfortable.  Puts pressure on the top of the foot which makes it hurt even more, so she has not been using it.    Separately, when she was in the ER, she was complaining of flank pains.  They did a CT scan of her abdomen without contrast.  No signs of kidney stones.  She was worried because it mentioned loops of small bowel and she was afraid that meant her intestines were looped around itself.    PMH: Asthma, HLD, anxiety/depression/PTSD, insomnia, pharyngeal cysts  PSH: Debbie-en-Y gastric bypass.  Gallbladder.  Left foot fracture.  Tonsils x3.    FH:  Sudden cardiac death event.  DM.  Breast cancer.    Allergies:  Multiple, see epic   Social:  Disabled.    Denies T/a/D     Exercise: No regular exercise program.  Stays active taking care parents, grandkids.      ENT:  [facility]   Pain:  A Alford  Cardiology:  Dr. Garsia   Psychiatry:  Dr. Stella Hall ([facility])  Counselor: Vy Mauricio  Gyn:  None     Pap:  None reported   MM2020, overdue     Colon:  2018.  Repeat 10 years ()        The following were updated and reviewed by myself in the chart: medications, past medical history, past surgical history, family history, social history, and allergies.     Medications:  Current Outpatient Medications on File Prior to Visit   Medication Sig Dispense Refill    albuterol (PROVENTIL) 2.5 mg /3 mL (0.083 %) nebulizer solution SMARTSI Ampule(s) Via Nebulizer Every 6 Hours PRN 75 mL 3    albuterol (PROVENTIL/VENTOLIN HFA) 90 mcg/actuation inhaler Inhale 2 puffs into the lungs every 6 (six) hours as needed for Wheezing or Shortness of Breath. 18 g 3    cyanocobalamin 1,000 mcg/mL injection Inject 1 mL (1,000 mcg total) into the muscle  every 30 days. 3 mL 3    diazePAM (VALIUM) 10 MG Tab Take by mouth.      estradioL (ESTRACE) 0.01 % (0.1 mg/gram) vaginal cream Place 1 g vaginally once daily. 42.5 g 1    ondansetron (ZOFRAN) 4 MG tablet Take 1 tablet (4 mg total) by mouth daily as needed for Nausea. 90 tablet 3    prazosin (MINIPRESS) 2 MG Cap Take 1 capsule (2 mg total) by mouth every evening. 90 capsule 3    prazosin (MINIPRESS) 5 MG capsule Take 5 mg by mouth.      sertraline (ZOLOFT) 100 MG tablet Take 1 tablet by mouth nightly as needed.      WIXELA INHUB 250-50 mcg/dose diskus inhaler Inhale 1 puff into the lungs 2 (two) times a day. Controller 180 each 3    zolpidem (AMBIEN) 10 mg Tab Take 1 tablet by mouth nightly as needed.      [DISCONTINUED] HYDROcodone-acetaminophen (NORCO) 7.5-325 mg per tablet TAKE 1 TABLET BY MOUTH EVERY 8 TO 12 HOURS AS NEEDED FOR PAIN      [DISCONTINUED] ALPRAZolam (XANAX) 1 MG tablet Take 1 mg by mouth. (Patient not taking: Reported on 4/8/2024)      [DISCONTINUED] fluorometholone 0.1% (FML) 0.1 % DrpS Place into the left eye.      [DISCONTINUED] naloxone (NARCAN) 4 mg/actuation Spry SMARTSIG:Right Eye      [DISCONTINUED] RESTASIS 0.05 % ophthalmic emulsion Place 1 drop into both eyes 2 (two) times daily.      [DISCONTINUED] triamcinolone acetonide 0.1% (KENALOG) 0.1 % cream 1 application 2 (two) times daily.      [DISCONTINUED] venlafaxine (EFFEXOR-XR) 37.5 MG 24 hr capsule Take 37.5 mg by mouth.       No current facility-administered medications on file prior to visit.        PMHx:  Past Medical History:   Diagnosis Date    Asthma     PTSD (post-traumatic stress disorder)       Patient Active Problem List    Diagnosis Date Noted    Anxiety 10/21/2020    Depression 10/21/2020    History of DVT (deep vein thrombosis) 10/21/2020    Bradycardia 09/01/2019    History of Debbie-en-Y gastric bypass 09/01/2019    B12 deficiency 03/14/2019    Mild intermittent asthma without complication 10/31/2018    Chronic nausea  "03/28/2018    Hyperlipidemia 03/28/2018    Insomnia due to other mental disorder 03/28/2018    PTSD (post-traumatic stress disorder) 03/28/2018        PSHx:  Past Surgical History:   Procedure Laterality Date    FOOT SURGERY Left 2018    plate in foot    HYSTERECTOMY      OOPHORECTOMY      TAYO-EN-Y PROCEDURE  2019    THROAT SURGERY  2020        FHx:  Family History   Problem Relation Age of Onset    Diabetes Mother     Heart disease Father     Ovarian cancer Maternal Grandmother     Breast cancer Maternal Grandmother     Breast cancer Paternal Grandmother         Social:  Social History     Socioeconomic History    Marital status: Single   Tobacco Use    Smoking status: Never     Passive exposure: Never    Smokeless tobacco: Never   Substance and Sexual Activity    Alcohol use: Not Currently    Drug use: Never    Sexual activity: Not Currently        Allergies:  Review of patient's allergies indicates:   Allergen Reactions    Cephalexin Hives and Shortness Of Breath    Codeine Anaphylaxis and Hives    Macrolide antibiotics Hives and Shortness Of Breath    Morphine Hives and Shortness Of Breath    Penicillins Hives and Shortness Of Breath    Ketorolac      agitation    Sulfa (sulfonamide antibiotics)         ROS:  Review of Systems   Constitutional:  Positive for activity change.   Cardiovascular:  Positive for leg swelling. Negative for chest pain.   Musculoskeletal:  Positive for arthralgias, gait problem and joint swelling.          Objective      BP (!) 150/92   Pulse 75   Temp 98.3 °F (36.8 °C)   Ht 5' 3" (1.6 m)   Wt 102.9 kg (226 lb 13.7 oz)   LMP 11/30/2022   SpO2 99%   BMI 40.19 kg/m²   Ht Readings from Last 3 Encounters:   04/08/24 5' 3" (1.6 m)   03/29/23 5' 3" (1.6 m)   03/15/23 5' 3" (1.6 m)     Wt Readings from Last 3 Encounters:   04/08/24 102.9 kg (226 lb 13.7 oz)   03/29/23 78.9 kg (173 lb 15.1 oz)   03/15/23 77.2 kg (170 lb 3.1 oz)       PHYSICAL EXAM:  Physical Exam  Vitals and nursing note " reviewed.   Constitutional:       General: She is not in acute distress.     Appearance: Normal appearance.   HENT:      Head: Normocephalic and atraumatic.      Right Ear: Tympanic membrane, ear canal and external ear normal.      Left Ear: Tympanic membrane, ear canal and external ear normal.      Nose: Nose normal. No congestion or rhinorrhea.      Mouth/Throat:      Mouth: Mucous membranes are moist.      Pharynx: Oropharynx is clear. No oropharyngeal exudate or posterior oropharyngeal erythema.   Eyes:      Extraocular Movements: Extraocular movements intact.      Conjunctiva/sclera: Conjunctivae normal.      Pupils: Pupils are equal, round, and reactive to light.   Cardiovascular:      Rate and Rhythm: Normal rate and regular rhythm.      Pulses:           Dorsalis pedis pulses are 2+ on the left side.   Pulmonary:      Effort: Pulmonary effort is normal. No respiratory distress.      Breath sounds: No wheezing, rhonchi or rales.   Musculoskeletal:         General: Normal range of motion.      Cervical back: Normal range of motion.      Right lower le+ Pitting Edema present.      Left lower le+ Pitting Edema present.   Lymphadenopathy:      Cervical: No cervical adenopathy.   Skin:     General: Skin is warm and dry.      Findings: No rash.   Neurological:      Mental Status: She is alert.              LABS / IMAGING:  Recent Results (from the past 4368 hour(s))   URINALYSIS    Collection Time: 24  5:33 AM   Result Value Ref Range    Color, UA Light Yellow Colorless, Light Yellow, Yellow, Dark Yellow, Straw, Elzbieta    Clarity BF Clear Clear    Specific Gravity, UA 1.005 1.001 - 1.035    Glucose, UA Negative Negative mg/dL    Protein, UA Negative Negative mg/dL    BILIRUBIN URINE Negative Negative    Urobilinogen, UA Negative Negative E.U./DL    Urine pH 5 5 - 8    Hgb Negative Negative    Ketones, UA Negative negative MG/DL    Nitrites, UA Negative Negative    Leukocytes, UA Negative Negative     Microscopic No     Body Fluid Source, Uric Acid Urine Straight Cath          Assessment    1. Hospital discharge follow-up    2. Closed fracture of left ankle with routine healing, subsequent encounter    3. Fluid retention in legs    4. Urinary retention          Plan    Ela was seen today for hospital follow up, ankle injury, back pain, urinary retention and edema.    Diagnoses and all orders for this visit:    Hospital discharge follow-up    Closed fracture of left ankle with routine healing, subsequent encounter  -     HYDROcodone-acetaminophen (NORCO)  mg per tablet; Take 1 tablet by mouth every 8 (eight) hours as needed for Pain.    Fluid retention in legs  -     bumetanide (BUMEX) 2 MG tablet; Take 1 tablet (2 mg total) by mouth once daily. for 5 days    Urinary retention  -     tamsulosin (FLOMAX) 0.4 mg Cap; Take 1 capsule (0.4 mg total) by mouth once daily. for 5 days      All things considered, she looks okay today.    Reviewed ER notes, x-ray reports, CT scan, from ER.      X-ray report does show an avulsion fracture of the lateral malleolus.  She has an appointment with Dr. Gooden already scheduled for Friday.  Okay to stay out the boot, but would like her to stay off of the foot.  Recommended rest, elevation.  Tylenol/ibuprofen.  Short course of Norco given for pain.      She states she has not urinated in two days.  Will have her come back in the morning to see Urology for possible catheter if still have not urinated.  In the meantime, will try some Flomax tonight.    In case she is able to urinate, will have her use Bumex rather than Lasix.    When she is able to urinate, then we can reassess for fluid retention.    Separately, the CT scan she had at the hospital did show a remote T12 fracture.  After we take care of the ankle, urinary symptoms, then we can get her in with Dr. SALDAÑA to discuss these findings.    FOLLOW-UP:  Follow up if symptoms worsen or fail to improve.    I spent a total of 45  minutes face to face and non-face to face on the date of this visit.This includes time preparing to see the patient (eg, review of tests, notes), obtaining and/or reviewing additional history from an independent historian and/or outside medical records, documenting clinical information in the electronic health record, independently interpreting results and/or communicating results to the patient/family/caregiver, or care coordinator.  Visit today included increased complexity associated with the care of the episodic problem addressed and managing the longitudinal care of the patient due to the serious and/or complex managed problem(s).    Signed by:  Nino Draper MD

## 2024-04-08 NOTE — PATIENT INSTRUCTIONS
Let us do a couple of things today...    Since you can not seem to urinate, let us try using some tamsulosin overnight to see if that helps relax the urethra and get some urine out.    Let us also try switching the Lasix to Bumex to help generate more urine.    If you still have not urinated by tomorrow morning, please come back to see our urologist, Dr. Johnson.  She should be able to insert a catheter to help get the urine out.  You have an appointment with her tomorrow morning at 9:00 a.m., here.      Overnight, if symptoms worsen, be sure to go to the ER because they will be able to do the catheter in the emergency room.    For the ankle, keep your appointment with Dr. Gooden on Friday.  In the meantime, stay off of it.  Elevate it as much as possible.  Alternate ice pack or cold compress with warm, moist heat.      You can use OTC Tylenol and ibuprofen, as needed, for pain.  I am sending a prescription for hydrocodone for severe pain.    After we get the bladder and the ankle taking care of, then we can get you in with our back specialist to discuss the T12 fracture and the numbness/tingling in the leg.

## 2024-04-09 ENCOUNTER — OFFICE VISIT (OUTPATIENT)
Dept: UROLOGY | Facility: CLINIC | Age: 54
End: 2024-04-09
Payer: MEDICARE

## 2024-04-09 ENCOUNTER — HOSPITAL ENCOUNTER (OUTPATIENT)
Dept: RADIOLOGY | Facility: HOSPITAL | Age: 54
Discharge: HOME | End: 2024-04-09
Attending: UROLOGY
Payer: MEDICARE

## 2024-04-09 ENCOUNTER — TELEPHONE (OUTPATIENT)
Dept: SPORTS MEDICINE | Facility: CLINIC | Age: 54
End: 2024-04-09
Payer: MEDICARE

## 2024-04-09 VITALS
WEIGHT: 226.88 LBS | BODY MASS INDEX: 40.2 KG/M2 | SYSTOLIC BLOOD PRESSURE: 128 MMHG | HEIGHT: 63 IN | HEART RATE: 60 BPM | RESPIRATION RATE: 18 BRPM | DIASTOLIC BLOOD PRESSURE: 87 MMHG

## 2024-04-09 DIAGNOSIS — R33.9 URINARY RETENTION: Primary | ICD-10-CM

## 2024-04-09 DIAGNOSIS — R60.9 EDEMA, UNSPECIFIED TYPE: ICD-10-CM

## 2024-04-09 LAB — POC RESIDUAL URINE VOLUME: 34 ML (ref 0–100)

## 2024-04-09 PROCEDURE — 51798 US URINE CAPACITY MEASURE: CPT | Mod: S$GLB,,, | Performed by: UROLOGY

## 2024-04-09 PROCEDURE — 1159F MED LIST DOCD IN RCRD: CPT | Mod: CPTII,S$GLB,, | Performed by: UROLOGY

## 2024-04-09 PROCEDURE — 3079F DIAST BP 80-89 MM HG: CPT | Mod: CPTII,S$GLB,, | Performed by: UROLOGY

## 2024-04-09 PROCEDURE — 99204 OFFICE O/P NEW MOD 45 MIN: CPT | Mod: S$GLB,,, | Performed by: UROLOGY

## 2024-04-09 PROCEDURE — 3074F SYST BP LT 130 MM HG: CPT | Mod: CPTII,S$GLB,, | Performed by: UROLOGY

## 2024-04-09 PROCEDURE — 76770 US EXAM ABDO BACK WALL COMP: CPT | Mod: TC,PN

## 2024-04-09 PROCEDURE — 76770 US EXAM ABDO BACK WALL COMP: CPT | Mod: 26,,, | Performed by: RADIOLOGY

## 2024-04-09 PROCEDURE — 3008F BODY MASS INDEX DOCD: CPT | Mod: CPTII,S$GLB,, | Performed by: UROLOGY

## 2024-04-09 PROCEDURE — 99999 PR PBB SHADOW E&M-EST. PATIENT-LVL IV: CPT | Mod: PBBFAC,,, | Performed by: UROLOGY

## 2024-04-09 NOTE — TELEPHONE ENCOUNTER
Called pt regarding upcoming appointment. Pt stated ER did xrays and she will try to obtain a disc to bring to her appointment. Requested she notify us if she cannot obtain the xray so we can order new imaging.

## 2024-04-09 NOTE — PROGRESS NOTES
"Chief Complaint   Patient presents with    New Patient Visit     Patient complains of urinary retention and edema       Referring Provider: Dr. Nino Draper      History of Present Illness:   Ela Lynn is a 54 y.o. female here for evaluation of New Patient Visit (Patient complains of urinary retention and edema)  .   4/9/24-55yo female, here for evaluation of possible urinary retention. Pt reports that she was laying on the couch and her foot went numb. She fell and broke her ankle. Went to the ED and they did imaging of her abdomen and noted a T12 fracture as well as the ankle fracture. She reports that she is having severe swelling throughout her entire body. She is using her inhalers due to wheezing. Feels like she is going to "drown". Saw her PCP yesterday and she was put on bumex and flomax. Following her first dose of that, she urinated a lot, but then it was just a trickle and she hasn't urinated since then.  States that after the medication, she felt a relief in her "kidneys", not her bladder. She denies feeling any pressure in her bladder or pelvis. No urge to urinate.       Review of Systems   Constitutional:  Negative for fever.   Respiratory:  Positive for shortness of breath (improving).    Cardiovascular:  Positive for leg swelling.   Musculoskeletal:  Positive for gait problem.   All other systems reviewed and are negative.        Past Medical History:   Diagnosis Date    Asthma     PTSD (post-traumatic stress disorder)        Past Surgical History:   Procedure Laterality Date    FOOT SURGERY Left 2018    plate in foot    HYSTERECTOMY      OOPHORECTOMY      TAYO-EN-Y PROCEDURE  2019    THROAT SURGERY  2020       Family History   Problem Relation Age of Onset    Diabetes Mother     Heart disease Father     Ovarian cancer Maternal Grandmother     Breast cancer Maternal Grandmother     Breast cancer Paternal Grandmother        Social History     Tobacco Use    Smoking status: Never     " Passive exposure: Never    Smokeless tobacco: Never   Substance Use Topics    Alcohol use: Not Currently    Drug use: Never       Current Outpatient Medications   Medication Sig Dispense Refill    albuterol (PROVENTIL) 2.5 mg /3 mL (0.083 %) nebulizer solution SMARTSI Ampule(s) Via Nebulizer Every 6 Hours PRN 75 mL 3    albuterol (PROVENTIL/VENTOLIN HFA) 90 mcg/actuation inhaler Inhale 2 puffs into the lungs every 6 (six) hours as needed for Wheezing or Shortness of Breath. 18 g 3    bumetanide (BUMEX) 2 MG tablet Take 1 tablet (2 mg total) by mouth once daily. for 5 days 5 tablet 0    cyanocobalamin 1,000 mcg/mL injection Inject 1 mL (1,000 mcg total) into the muscle every 30 days. 3 mL 3    diazePAM (VALIUM) 10 MG Tab Take by mouth.      HYDROcodone-acetaminophen (NORCO)  mg per tablet Take 1 tablet by mouth every 8 (eight) hours as needed for Pain. 21 tablet 0    ondansetron (ZOFRAN) 4 MG tablet Take 1 tablet (4 mg total) by mouth daily as needed for Nausea. 90 tablet 3    prazosin (MINIPRESS) 2 MG Cap Take 1 capsule (2 mg total) by mouth every evening. 90 capsule 3    prazosin (MINIPRESS) 5 MG capsule Take 5 mg by mouth.      sertraline (ZOLOFT) 100 MG tablet Take 1 tablet by mouth nightly as needed.      tamsulosin (FLOMAX) 0.4 mg Cap Take 1 capsule (0.4 mg total) by mouth once daily. for 5 days 5 capsule 0    WIXELA INHUB 250-50 mcg/dose diskus inhaler Inhale 1 puff into the lungs 2 (two) times a day. Controller 180 each 3    zolpidem (AMBIEN) 10 mg Tab Take 1 tablet by mouth nightly as needed.      estradioL (ESTRACE) 0.01 % (0.1 mg/gram) vaginal cream Place 1 g vaginally once daily. 42.5 g 1     No current facility-administered medications for this visit.       Review of patient's allergies indicates:   Allergen Reactions    Cephalexin Hives and Shortness Of Breath    Codeine Anaphylaxis and Hives    Macrolide antibiotics Hives and Shortness Of Breath    Morphine Hives and Shortness Of Breath     Penicillins Hives and Shortness Of Breath    Ketorolac      agitation    Sulfa (sulfonamide antibiotics)        Physical Exam  Vitals:    04/09/24 0942   BP: 128/87   Pulse: 60   Resp: 18     General: Well-developed, well-nourished, in no acute distress  HEENT: Normocephalic, atraumatic, extraocular movements intact  Neck: Supple, no supraclavicular or cervical lymphadenopathy, trachea midline  Respirations: even and unlabored  Back: midline spine, No CVA tenderness  Abdomen: soft, Non-tender, non-distended, no palpable masses, no rebound or guarding  Extremities: left ankle swelling  Skin: Warm and dry. No lesions  Psych: normal affect  Neuro: Alert and oriented x 3. Cranial nerves II-XII intact    Bladder scan (not PVR): 34mL    Urinalysis  4/2/24: negative for blood, LE, nit    Renal US: completed today and personally reviewed, noting no hydronephrosis. No evidence of retention. No stones seen.     Assessment:  1. Urinary retention  POCT Bladder Scan      2. Edema, unspecified type  Basic Metabolic Panel    US Retroperitoneal Complete            Plan:   Urinary retention  -     POCT Bladder Scan    Edema, unspecified type  -     Basic Metabolic Panel; Future; Expected date: 04/09/2024  -     US Retroperitoneal Complete; Future; Expected date: 04/09/2024      Given her low volume on bladder scan, I doubt her edema is related to urinary retention.     Follow up if symptoms worsen or fail to improve.

## 2024-04-11 DIAGNOSIS — M25.572 LEFT ANKLE PAIN, UNSPECIFIED CHRONICITY: Primary | ICD-10-CM

## 2024-04-12 ENCOUNTER — HOSPITAL ENCOUNTER (OUTPATIENT)
Dept: RADIOLOGY | Facility: HOSPITAL | Age: 54
Discharge: HOME | End: 2024-04-12
Attending: STUDENT IN AN ORGANIZED HEALTH CARE EDUCATION/TRAINING PROGRAM
Payer: MEDICARE

## 2024-04-12 ENCOUNTER — OFFICE VISIT (OUTPATIENT)
Dept: SPORTS MEDICINE | Facility: CLINIC | Age: 54
End: 2024-04-12
Payer: MEDICARE

## 2024-04-12 ENCOUNTER — PATIENT MESSAGE (OUTPATIENT)
Dept: SPORTS MEDICINE | Facility: CLINIC | Age: 54
End: 2024-04-12
Payer: MEDICARE

## 2024-04-12 DIAGNOSIS — S82.62XA CLOSED FRACTURE OF DISTAL LATERAL MALLEOLUS OF LEFT FIBULA, INITIAL ENCOUNTER: Primary | ICD-10-CM

## 2024-04-12 DIAGNOSIS — M25.572 LEFT ANKLE PAIN, UNSPECIFIED CHRONICITY: ICD-10-CM

## 2024-04-12 PROCEDURE — 1159F MED LIST DOCD IN RCRD: CPT | Mod: CPTII,S$GLB,, | Performed by: STUDENT IN AN ORGANIZED HEALTH CARE EDUCATION/TRAINING PROGRAM

## 2024-04-12 PROCEDURE — 73610 X-RAY EXAM OF ANKLE: CPT | Mod: TC,FY,PO,LT

## 2024-04-12 PROCEDURE — 73610 X-RAY EXAM OF ANKLE: CPT | Mod: 26,LT,, | Performed by: RADIOLOGY

## 2024-04-12 PROCEDURE — 99999 PR PBB SHADOW E&M-EST. PATIENT-LVL III: CPT | Mod: PBBFAC,,, | Performed by: STUDENT IN AN ORGANIZED HEALTH CARE EDUCATION/TRAINING PROGRAM

## 2024-04-12 PROCEDURE — 99204 OFFICE O/P NEW MOD 45 MIN: CPT | Mod: 57,S$GLB,, | Performed by: STUDENT IN AN ORGANIZED HEALTH CARE EDUCATION/TRAINING PROGRAM

## 2024-04-12 PROCEDURE — 27786 TREATMENT OF ANKLE FRACTURE: CPT | Mod: LT,S$GLB,, | Performed by: STUDENT IN AN ORGANIZED HEALTH CARE EDUCATION/TRAINING PROGRAM

## 2024-04-12 RX ORDER — IBUPROFEN 800 MG/1
800 TABLET ORAL EVERY 8 HOURS PRN
Qty: 30 TABLET | Refills: 0 | Status: SHIPPED | OUTPATIENT
Start: 2024-04-12

## 2024-04-12 NOTE — PATIENT INSTRUCTIONS
Assessment:  Ela Lynn is a 54 y.o. female with a chief complaint of Pain and Injury of the Left Ankle    Encounter Diagnosis   Name Primary?    Closed fracture of distal lateral malleolus of left fibula, initial encounter Yes      Plan:  X-rays reviewed - acute avulsion fracture of the distal lateral malleolus of the left ankle   Labs reviewed - Cr 0.7, GFR > 60   History and clinical exam is consistent with acute left ankle injury, avulsion fracture as above.  CT reports suggestive of a chronic appearing compression deformity of the T12 vertebra, likely related to the current issue/situation.    Diagnosis, treatment options, prognosis discussed today.    No indication for surgical mention at this.  We will switch to a shorter ankle boot, given size and issues with the when she got from the ED.    At least 10 minutes were spent sizing, fitting, and educating regarding durable medical equipment today by clinical assistant under direction of Shawn Gooden MD.    Prescription for ibuprofen 800 mg sent today.  Take 1 tablet every 6-8 hours as needed for pain and discomfort.  Take as 1st line for pain, and can take with Tylenol for synergistic pain relieving affect.    Would recommend you use her Norco , as prescribed by Dr. Draper, for severe, breakthrough pain not controlled with ibuprofen and Tylenol.    Recommend relative rest of the affected ankle, elevating it at or above the level of your heart when you are sitting and laying at home.    You are able weight bear on this ankle, as tolerated, but initially would recommend use of your knee scooter.  But if you are pain-free, you can walk on this ankle.  Recommend that you elevate your leg when you are sleeping, with use of the pillow, or a wedge cushion.  This will help with the swelling in the discomfort.  Recommend to ice the affected ankle 2-3 times per day, for 10-15 minutes, to help with the swelling in the inflammation.  We will check a  Vit D, given h/o gastric Debbie-en-Y, and poor bone healing in the past.  Additionally, I am concerned for possible low bone density, so if Vit D is low, would likely benefit from a DEXA scan, given h/o Debbie-en-Y, and T12 compression deformity.  Healing time for these types of fractures typically around 6-8 weeks.  We will recheck x-rays and proximally 1 month, and reassess clinical and radiographic healing.    Follow-up:  Four weeks with repeat x-rays or sooner if there are any problems between now and then.    Thank you for choosing [facility] and Dr. Shawn Gooden for your orthopedic & sports medicine care. It is our goal to provide you with exceptional care that will help keep you healthy, active, and get you back in the game.    Please do not hesitate to reach out to us via email, phone, or MyChart with any questions, concerns, or feedback.    If you are experiencing pain/discomfort ,or have questions after 5pm and would like to be connected to the [facility] on-call team, please call this number and specify which Sports Medicine provider is treating you: [phone]

## 2024-04-12 NOTE — PROGRESS NOTES
"      Patient ID: Ela Lynn  YOB: 1970  MRN: [id number]    Chief Complaint: Pain and Injury of the Left Ankle    Referred By: Self    Occupation: Disabled      History of Present Illness: Ela Lynn is a 54 y.o. female who presents today with Pain and Injury of the Left Ankle    She injured her left ankle on 4/1/24 when she stood up without realizing her leg had "fallen asleep" and collapsed to the floor.  She suffered an inversion mechanism at the ankle.  She had immediate ankle pain around the lateral malleolus.  She had another fall later that day while trying to get in her car when her ankle gave out underneath her.  She was seen at the ED complaining of ankle and back pain.  She was diagnosed with a left distal fibula avulsion fracture and a T12 compression fracture.  She was placed in a walking boot but is unable to wear it due to pain.  She has been using a knee scooter for ambulation.  She is unaware that she has an ankle fracture.  She was prescribed norco, toradol and is using ibuprofen.  She is using ice and elevating her leg.  She has a lot of pain around the lateral ankle.    Past Medical History:   Past Medical History:   Diagnosis Date    Asthma     Bradycardia     PTSD (post-traumatic stress disorder)      Past Surgical History:   Procedure Laterality Date    FOOT SURGERY Left 2018    plate in foot    HYSTERECTOMY      OOPHORECTOMY      TAYO-EN-Y PROCEDURE  2019    THROAT SURGERY  2020     Family History   Problem Relation Age of Onset    Diabetes Mother     Heart disease Father     Ovarian cancer Maternal Grandmother     Breast cancer Maternal Grandmother     Breast cancer Paternal Grandmother      Social History     Socioeconomic History    Marital status: Single   Tobacco Use    Smoking status: Never     Passive exposure: Never    Smokeless tobacco: Never   Substance and Sexual Activity    Alcohol use: Not Currently    Drug use: Never    Sexual activity: Not " Currently     Medication List with Changes/Refills   New Medications    IBUPROFEN (ADVIL,MOTRIN) 800 MG TABLET    Take 1 tablet (800 mg total) by mouth every 8 (eight) hours as needed for Pain.   Current Medications    ALBUTEROL (PROVENTIL) 2.5 MG /3 ML (0.083 %) NEBULIZER SOLUTION    SMARTSI Ampule(s) Via Nebulizer Every 6 Hours PRN    ALBUTEROL (PROVENTIL/VENTOLIN HFA) 90 MCG/ACTUATION INHALER    Inhale 2 puffs into the lungs every 6 (six) hours as needed for Wheezing or Shortness of Breath.    BUMETANIDE (BUMEX) 2 MG TABLET    Take 1 tablet (2 mg total) by mouth once daily. for 5 days    CYANOCOBALAMIN 1,000 MCG/ML INJECTION    Inject 1 mL (1,000 mcg total) into the muscle every 30 days.    DIAZEPAM (VALIUM) 10 MG TAB    Take by mouth.    ESTRADIOL (ESTRACE) 0.01 % (0.1 MG/GRAM) VAGINAL CREAM    Place 1 g vaginally once daily.    HYDROCODONE-ACETAMINOPHEN (NORCO)  MG PER TABLET    Take 1 tablet by mouth every 8 (eight) hours as needed for Pain.    ONDANSETRON (ZOFRAN) 4 MG TABLET    Take 1 tablet (4 mg total) by mouth daily as needed for Nausea.    PRAZOSIN (MINIPRESS) 2 MG CAP    Take 1 capsule (2 mg total) by mouth every evening.    PRAZOSIN (MINIPRESS) 5 MG CAPSULE    Take 5 mg by mouth.    SERTRALINE (ZOLOFT) 100 MG TABLET    Take 1 tablet by mouth nightly as needed.    TAMSULOSIN (FLOMAX) 0.4 MG CAP    Take 1 capsule (0.4 mg total) by mouth once daily. for 5 days    WIXELA INHUB 250-50 MCG/DOSE DISKUS INHALER    Inhale 1 puff into the lungs 2 (two) times a day. Controller    ZOLPIDEM (AMBIEN) 10 MG TAB    Take 1 tablet by mouth nightly as needed.     Review of patient's allergies indicates:   Allergen Reactions    Cephalexin Hives and Shortness Of Breath    Codeine Anaphylaxis and Hives    Macrolide antibiotics Hives and Shortness Of Breath    Morphine Hives and Shortness Of Breath    Penicillins Hives and Shortness Of Breath    Ketorolac      agitation    Sulfa (sulfonamide antibiotics)         Physical Exam:   There is no height or weight on file to calculate BMI.    Physical Exam  Detailed MSK exam:     Left Ankle:  Inspection: Moderate swelling lateral ankle and foot  Palpation tenderness: Distal fibula     Global tenderness around lateral ankle and foot  Range of motion:  Global ankle motion restriction secondary to injury  Strength:  Not tested secondary to fracture  N/V Exam:  Tibial:    Normal sensory (plantar foot)  FHL s/p fusion, no motion at baseline   Sup Peroneal:  Normal sensory (dorsal foot)  Normal motor (Peroneals)            Deep Peroneal:  Normal sensory (1st web space) EHL s/p fusion, no motion at baseline    Sural:   Normal sensory (lateral foot)   Saphenous:   Normal sensory (medial lower leg)   Normal pedal pulses, warm and well perfused with capillary refill < 2 sec   Calf soft and compressible    Imaging:  XR L ankle - 04/12/2024    Relevant imaging results were reviewed and interpreted by me and per my read:  Acute, mildly displaced avulsion fracture of the distal tip of the lateral malleolus.  Normal appearance of the mortise.  Degenerative changes about the left ankle and midfoot.  Prior hardware in the right 1st digit and metatarsal.    This was discussed with the patient and / or family today.     Patient Instructions   Assessment:  Ela Lynn is a 54 y.o. female with a chief complaint of Pain and Injury of the Left Ankle    Encounter Diagnosis   Name Primary?    Closed fracture of distal lateral malleolus of left fibula, initial encounter Yes      Plan:  X-rays reviewed - acute avulsion fracture of the distal lateral malleolus of the left ankle   Labs reviewed - Cr 0.7, GFR > 60   History and clinical exam is consistent with acute left ankle injury, avulsion fracture as above.  CT reports suggestive of a chronic appearing compression deformity of the T12 vertebra, likely related to the current issue/situation.    Diagnosis, treatment options, prognosis  discussed today.    No indication for surgical mention at this.  We will switch to a shorter ankle boot, given size and issues with the when she got from the ED.    At least 10 minutes were spent sizing, fitting, and educating regarding durable medical equipment today by clinical assistant under direction of Shawn Gooden MD.    Prescription for ibuprofen 800 mg sent today.  Take 1 tablet every 6-8 hours as needed for pain and discomfort.  Take as 1st line for pain, and can take with Tylenol for synergistic pain relieving affect.    Would recommend you use her Norco , as prescribed by Dr. Draper, for severe, breakthrough pain not controlled with ibuprofen and Tylenol.    Recommend relative rest of the affected ankle, elevating it at or above the level of your heart when you are sitting and laying at home.    You are able weight bear on this ankle, as tolerated, but initially would recommend use of your knee scooter.  But if you are pain-free, you can walk on this ankle.  Recommend that you elevate your leg when you are sleeping, with use of the pillow, or a wedge cushion.  This will help with the swelling in the discomfort.  Recommend to ice the affected ankle 2-3 times per day, for 10-15 minutes, to help with the swelling in the inflammation.  We will check a Vit D, given h/o gastric Debbie-en-Y, and poor bone healing in the past.  Additionally, I am concerned for possible low bone density, so if Vit D is low, would likely benefit from a DEXA scan, given h/o Debbie-en-Y, and T12 compression deformity.  Healing time for these types of fractures typically around 6-8 weeks.  We will recheck x-rays and proximally 1 month, and reassess clinical and radiographic healing.    Follow-up:  Four weeks with repeat x-rays or sooner if there are any problems between now and then.    Thank you for choosing [facility] and Dr. Shawn Gooden for your orthopedic & sports medicine care. It is our goal  to provide you with exceptional care that will help keep you healthy, active, and get you back in the game.    Please do not hesitate to reach out to us via email, phone, or MyChart with any questions, concerns, or feedback.    If you are experiencing pain/discomfort ,or have questions after 5pm and would like to be connected to the [facility] on-call team, please call this number and specify which Sports Medicine provider is treating you: [phone]       A copy of today's visit note has been sent to the referring provider.           Shawn Gooden MD  Primary Care Sports Medicine    Disclaimer: This note was prepared using a voice recognition system and is likely to have sound alike errors within the text.

## 2024-04-14 ENCOUNTER — PATIENT MESSAGE (OUTPATIENT)
Dept: ADMINISTRATIVE | Facility: HOSPITAL | Age: 54
End: 2024-04-14
Payer: MEDICARE

## 2024-04-15 DIAGNOSIS — Z12.11 SCREENING FOR COLON CANCER: ICD-10-CM

## 2024-06-19 ENCOUNTER — TELEPHONE (OUTPATIENT)
Dept: PRIMARY CARE CLINIC | Facility: CLINIC | Age: 54
End: 2024-06-19
Payer: MEDICARE

## 2024-06-19 NOTE — TELEPHONE ENCOUNTER
----- Message from Stephie Mensah sent at 6/19/2024 12:43 PM CDT -----  Contact: [phone]  Type:  Same Day Appointment Request    Caller is requesting a same day appointment.  Caller declined first available appointment listed below.    Name of Caller:ARSLAN NIXON [[id number]]  When is the first available appointment?in NOVEMBER , and patient need to be seen soon  Symptoms:holding water weigh/ feels nausea / in a lot of pain / barely can walk / took a 40mg and 80mg and have not move nothing  Best Call Back Number: [phone]  Additional Information: [id number] [id number]

## 2024-06-19 NOTE — TELEPHONE ENCOUNTER
Spoke with pt and she advised me that she has fluid all over her body. Pt advised me that she feel the fluid in her chest and pressure. I advised pt to go to ER pt refused and said she will wait until her scheduled appt.

## 2024-06-19 NOTE — TELEPHONE ENCOUNTER
----- Message from Stephie Mensah sent at 6/19/2024 12:43 PM CDT -----  Contact: [phone]  Type:  Same Day Appointment Request    Caller is requesting a same day appointment.  Caller declined first available appointment listed below.    Name of Caller:ARSLAN NIXON [[id number]]  When is the first available appointment?in NOVEMBER , and patient need to be seen soon  Symptoms:holding water weigh/ feels nausea / in a lot of pain / barely can walk / took a 40mg and 80mg and have not move nothing  Best Call Back Number: [phone]  Additional Information: [id number]

## 2024-08-14 DIAGNOSIS — J45.30 MILD PERSISTENT ASTHMA WITHOUT COMPLICATION: ICD-10-CM

## 2024-08-14 RX ORDER — ALBUTEROL SULFATE 90 UG/1
2 INHALANT RESPIRATORY (INHALATION) EVERY 6 HOURS PRN
Qty: 20.1 G | Refills: 2 | Status: SHIPPED | OUTPATIENT
Start: 2024-08-14

## 2024-08-14 NOTE — TELEPHONE ENCOUNTER
Refill Decision Note   Ela Lynn  is requesting a refill authorization.  Brief Assessment and Rationale for Refill:  Approve     Medication Therapy Plan:         Alert overridden per protocol: Yes   Comments:     Note composed:6:27 PM 08/14/2024

## 2024-08-14 NOTE — TELEPHONE ENCOUNTER
No care due was identified.  [facility] Embedded Care Due Messages. Reference number: [id number].   8/14/2024 2:26:21 PM CDT

## 2025-02-12 DIAGNOSIS — Z12.31 OTHER SCREENING MAMMOGRAM: ICD-10-CM
